# Patient Record
Sex: FEMALE | Race: WHITE
[De-identification: names, ages, dates, MRNs, and addresses within clinical notes are randomized per-mention and may not be internally consistent; named-entity substitution may affect disease eponyms.]

---

## 2018-11-12 ENCOUNTER — HOSPITAL ENCOUNTER (OUTPATIENT)
Dept: HOSPITAL 19 - COL.CAR | Age: 68
Discharge: HOME | End: 2018-11-12
Attending: INTERNAL MEDICINE
Payer: MEDICARE

## 2018-11-12 VITALS — SYSTOLIC BLOOD PRESSURE: 159 MMHG | HEART RATE: 82 BPM | DIASTOLIC BLOOD PRESSURE: 89 MMHG

## 2018-11-12 VITALS — SYSTOLIC BLOOD PRESSURE: 140 MMHG | HEART RATE: 80 BPM | DIASTOLIC BLOOD PRESSURE: 90 MMHG

## 2018-11-12 VITALS — DIASTOLIC BLOOD PRESSURE: 86 MMHG | HEART RATE: 85 BPM | SYSTOLIC BLOOD PRESSURE: 162 MMHG

## 2018-11-12 VITALS — SYSTOLIC BLOOD PRESSURE: 149 MMHG | TEMPERATURE: 97.5 F | HEART RATE: 80 BPM | DIASTOLIC BLOOD PRESSURE: 73 MMHG

## 2018-11-12 VITALS — WEIGHT: 168.87 LBS | HEIGHT: 67.06 IN | BODY MASS INDEX: 26.51 KG/M2

## 2018-11-12 VITALS — SYSTOLIC BLOOD PRESSURE: 158 MMHG | HEART RATE: 82 BPM | DIASTOLIC BLOOD PRESSURE: 80 MMHG

## 2018-11-12 VITALS — HEART RATE: 87 BPM | DIASTOLIC BLOOD PRESSURE: 68 MMHG | SYSTOLIC BLOOD PRESSURE: 110 MMHG

## 2018-11-12 VITALS — HEART RATE: 85 BPM | DIASTOLIC BLOOD PRESSURE: 84 MMHG | SYSTOLIC BLOOD PRESSURE: 145 MMHG

## 2018-11-12 VITALS — DIASTOLIC BLOOD PRESSURE: 89 MMHG | SYSTOLIC BLOOD PRESSURE: 137 MMHG | HEART RATE: 80 BPM

## 2018-11-12 DIAGNOSIS — J44.9: ICD-10-CM

## 2018-11-12 DIAGNOSIS — M81.0: ICD-10-CM

## 2018-11-12 DIAGNOSIS — I44.7: ICD-10-CM

## 2018-11-12 DIAGNOSIS — I42.9: Primary | ICD-10-CM

## 2018-11-12 DIAGNOSIS — Z80.6: ICD-10-CM

## 2018-11-12 DIAGNOSIS — Z80.0: ICD-10-CM

## 2018-11-12 DIAGNOSIS — Z85.048: ICD-10-CM

## 2018-11-12 DIAGNOSIS — I34.0: ICD-10-CM

## 2018-11-12 DIAGNOSIS — F17.210: ICD-10-CM

## 2018-11-12 LAB
ANION GAP SERPL CALC-SCNC: 5 MMOL/L (ref 7–16)
BUN SERPL-MCNC: 14 MG/DL (ref 7–17)
CALCIUM SERPL-MCNC: 9.6 MG/DL (ref 8.4–10.2)
CHLORIDE SERPL-SCNC: 105 MMOL/L (ref 98–107)
CO2 SERPL-SCNC: 29 MMOL/L (ref 22–30)
CREAT SERPL-SCNC: 0.61 MG/DL (ref 0.52–1.25)
ERYTHROCYTE [DISTWIDTH] IN BLOOD BY AUTOMATED COUNT: 14.5 % (ref 11.5–14.5)
GLUCOSE SERPL-MCNC: 97 MG/DL (ref 74–106)
HCT VFR BLD AUTO: 41.1 % (ref 37–47)
HGB BLD-MCNC: 13.4 G/DL (ref 12.5–16)
INR BLD: 1.1 (ref 0.8–3)
MCH RBC QN AUTO: 32 PG (ref 27–31)
MCHC RBC AUTO-ENTMCNC: 33 G/DL (ref 33–37)
MCV RBC AUTO: 99 FL (ref 80–100)
PLATELET # BLD AUTO: 323 K/MM3 (ref 130–400)
PMV BLD AUTO: 8.5 FL (ref 7.4–10.4)
POTASSIUM SERPL-SCNC: 3.9 MMOL/L (ref 3.4–5)
PROTHROMBIN TIME: 12.3 SECONDS (ref 9.7–12.8)
RBC # BLD AUTO: 4.16 M/MM3 (ref 4.1–5.3)
SODIUM SERPL-SCNC: 139 MMOL/L (ref 137–145)

## 2019-02-15 ENCOUNTER — HOSPITAL ENCOUNTER (OUTPATIENT)
Dept: HOSPITAL 19 - COL.CAR | Age: 69
LOS: 1 days | Discharge: HOME | End: 2019-02-16
Attending: INTERNAL MEDICINE
Payer: MEDICARE

## 2019-02-15 VITALS — DIASTOLIC BLOOD PRESSURE: 78 MMHG | HEART RATE: 74 BPM | SYSTOLIC BLOOD PRESSURE: 158 MMHG

## 2019-02-15 VITALS — WEIGHT: 171.96 LBS | BODY MASS INDEX: 26.99 KG/M2 | HEIGHT: 67.05 IN

## 2019-02-15 VITALS — DIASTOLIC BLOOD PRESSURE: 69 MMHG | HEART RATE: 69 BPM | SYSTOLIC BLOOD PRESSURE: 133 MMHG

## 2019-02-15 VITALS — SYSTOLIC BLOOD PRESSURE: 145 MMHG | DIASTOLIC BLOOD PRESSURE: 81 MMHG | HEART RATE: 81 BPM

## 2019-02-15 VITALS — TEMPERATURE: 98.1 F | SYSTOLIC BLOOD PRESSURE: 145 MMHG | HEART RATE: 81 BPM | DIASTOLIC BLOOD PRESSURE: 88 MMHG

## 2019-02-15 VITALS — HEART RATE: 73 BPM | DIASTOLIC BLOOD PRESSURE: 77 MMHG | SYSTOLIC BLOOD PRESSURE: 152 MMHG

## 2019-02-15 VITALS — SYSTOLIC BLOOD PRESSURE: 144 MMHG | TEMPERATURE: 97.5 F | DIASTOLIC BLOOD PRESSURE: 77 MMHG | HEART RATE: 72 BPM

## 2019-02-15 VITALS — HEART RATE: 83 BPM | DIASTOLIC BLOOD PRESSURE: 81 MMHG | SYSTOLIC BLOOD PRESSURE: 141 MMHG

## 2019-02-15 VITALS — DIASTOLIC BLOOD PRESSURE: 88 MMHG | SYSTOLIC BLOOD PRESSURE: 154 MMHG | HEART RATE: 48 BPM

## 2019-02-15 DIAGNOSIS — I42.9: ICD-10-CM

## 2019-02-15 DIAGNOSIS — I34.0: ICD-10-CM

## 2019-02-15 DIAGNOSIS — I47.2: ICD-10-CM

## 2019-02-15 DIAGNOSIS — I44.7: ICD-10-CM

## 2019-02-15 DIAGNOSIS — I50.20: Primary | ICD-10-CM

## 2019-02-15 DIAGNOSIS — J44.9: ICD-10-CM

## 2019-02-15 DIAGNOSIS — M81.0: ICD-10-CM

## 2019-02-15 LAB
ANION GAP SERPL CALC-SCNC: 6 MMOL/L (ref 7–16)
BUN SERPL-MCNC: 15 MG/DL (ref 7–17)
CALCIUM SERPL-MCNC: 9.7 MG/DL (ref 8.4–10.2)
CHLORIDE SERPL-SCNC: 106 MMOL/L (ref 98–107)
CO2 SERPL-SCNC: 28 MMOL/L (ref 22–30)
CREAT SERPL-SCNC: 0.66 MG/DL (ref 0.52–1.25)
ERYTHROCYTE [DISTWIDTH] IN BLOOD BY AUTOMATED COUNT: 16 % (ref 11.5–14.5)
GLUCOSE SERPL-MCNC: 91 MG/DL (ref 74–106)
HCT VFR BLD AUTO: 39.9 % (ref 37–47)
HGB BLD-MCNC: 13.2 G/DL (ref 12.5–16)
INR BLD: 1 (ref 0.8–3)
MCH RBC QN AUTO: 33 PG (ref 27–31)
MCHC RBC AUTO-ENTMCNC: 33 G/DL (ref 33–37)
MCV RBC AUTO: 98 FL (ref 80–100)
PLATELET # BLD AUTO: 252 K/MM3 (ref 130–400)
PMV BLD AUTO: 8.3 FL (ref 7.4–10.4)
POTASSIUM SERPL-SCNC: 4 MMOL/L (ref 3.4–5)
PROTHROMBIN TIME: 11.5 SECONDS (ref 9.7–12.8)
RBC # BLD AUTO: 4.06 M/MM3 (ref 4.1–5.3)
SODIUM SERPL-SCNC: 139 MMOL/L (ref 137–145)

## 2019-02-15 PROCEDURE — C1769 GUIDE WIRE: HCPCS

## 2019-02-15 PROCEDURE — C1900 LEAD, CORONARY VENOUS: HCPCS

## 2019-02-15 PROCEDURE — C1898 LEAD, PMKR, OTHER THAN TRANS: HCPCS

## 2019-02-15 PROCEDURE — C1777 LEAD, AICD, ENDO SINGLE COIL: HCPCS

## 2019-02-15 PROCEDURE — C1894 INTRO/SHEATH, NON-LASER: HCPCS

## 2019-02-15 PROCEDURE — C1882 AICD, OTHER THAN SING/DUAL: HCPCS

## 2019-02-15 NOTE — NUR
VENOGRAM PERFORMED USING LEFT ARM IV SITE. SUBCLAVIAN VEIN PATENT. ALL
SEDATION MEDICATIONS WILL BE GIVEN WITH VERBAL ORDER FROM MD GUTIÉRREZ. SEE MERGE
FOR ADMIN TIMES.

## 2019-02-15 NOTE — NUR
PT IN BED WITH HOB ELEVATED TO 45 DEGREE ANGLE. PT HAS SLING ON AND IS A/O X4.
PT HAS C/O PAIN RATED AT A 6/10, ACHING. PT DOES NOT WANT ANY PAIN MEDICATION
ONLY TYLENOL. REFUSES ICE PACK. CALL LIGHT WITHIN REACH.

## 2019-02-15 NOTE — NUR
pT arrived to unit, alert but sleepy, site to lt chest is CDI. Vitals stable,
physical assessmetn completed, will continue to monitor, call light in reach

## 2019-02-15 NOTE — NUR
Report given to Rebecca CELAYA, pt denies needs. Since lt arrived to unit, dressing
has remained CDI, pt has denied pain until shift change but she refuses
meds/ice at htis time. She has been assisted with standby assist to bathroom
to void clear yellow urine. Pt denies need,s vitals stbale, no needs at this
time. Still sleepy .

## 2019-02-16 VITALS — SYSTOLIC BLOOD PRESSURE: 154 MMHG | HEART RATE: 82 BPM | TEMPERATURE: 98.9 F | DIASTOLIC BLOOD PRESSURE: 72 MMHG

## 2019-02-16 VITALS — TEMPERATURE: 98.4 F | DIASTOLIC BLOOD PRESSURE: 88 MMHG | HEART RATE: 64 BPM | SYSTOLIC BLOOD PRESSURE: 172 MMHG

## 2019-02-16 VITALS — SYSTOLIC BLOOD PRESSURE: 141 MMHG | TEMPERATURE: 98.5 F | HEART RATE: 90 BPM | DIASTOLIC BLOOD PRESSURE: 101 MMHG

## 2019-02-16 VITALS — DIASTOLIC BLOOD PRESSURE: 85 MMHG | SYSTOLIC BLOOD PRESSURE: 157 MMHG

## 2019-02-16 NOTE — NUR
PT IN BED WITH HOB ELEVATED TO 45 DEGREE ANGLE, SOFTLY SNORING, RESP EVEN AND
UNLABORED, WITH NO S/S OF PAIN OR DISTRESS NOTED. CALL LIGHT WITHIN REACH.

## 2019-02-16 NOTE — NUR
UNEVENFUL NIGHT FOR PT. PT SLEPT WITH HOB AT 45 DEGREE ANGLE. PT AWAKENED
AROUND 0500 AND HAD SOME COFFEE. PT HAS PAIN, BUT REFUSES TO TAKE ANY PAIN
MEDICATION OR ICE PACK TO THE LEFT UPPER CHEST. NO FURTHER NEEDS, AND CALL
LIGHT WITHIN REACH.

## 2019-02-16 NOTE — NUR
Assessment complete.patient awake,a/ox4.reports soreness to pacermaker
site.denies need for pain meds.breathing even and unlabored.LSCTA.pacermaker
site to left upper chest CDI.sling in place.education provided.patient took
all meds.no needs voiced at this time.call light in reach

## 2019-02-16 NOTE — NUR
pt discharge darlene at this time.all discharge instructions reviewed.Pacemaker
card given to patient.Iv and telemetry discontinued.all questions
answered.This RN escorted patient out

## 2019-11-11 ENCOUNTER — HOSPITAL ENCOUNTER (INPATIENT)
Dept: HOSPITAL 19 - INPTSU | Age: 69
LOS: 39 days | Discharge: HOME | DRG: 470 | End: 2019-12-20
Attending: ORTHOPAEDIC SURGERY | Admitting: ORTHOPAEDIC SURGERY
Payer: MEDICARE

## 2019-11-11 VITALS — HEIGHT: 67 IN | WEIGHT: 160.5 LBS | BODY MASS INDEX: 25.19 KG/M2

## 2019-11-11 DIAGNOSIS — M16.11: Primary | ICD-10-CM

## 2019-11-11 PROCEDURE — A4314 CATH W/DRAINAGE 2-WAY LATEX: HCPCS

## 2019-11-11 PROCEDURE — A9284 NON-ELECTRONIC SPIROMETER: HCPCS

## 2019-11-11 PROCEDURE — C1713 ANCHOR/SCREW BN/BN,TIS/BN: HCPCS

## 2019-11-11 PROCEDURE — C1776 JOINT DEVICE (IMPLANTABLE): HCPCS

## 2019-12-09 ENCOUNTER — HOSPITAL ENCOUNTER (OUTPATIENT)
Dept: HOSPITAL 19 - COL.LAB | Age: 69
End: 2019-12-09
Attending: ORTHOPAEDIC SURGERY
Payer: MEDICARE

## 2019-12-09 DIAGNOSIS — Z01.812: Primary | ICD-10-CM

## 2019-12-09 DIAGNOSIS — Z01.83: ICD-10-CM

## 2019-12-09 DIAGNOSIS — M16.11: ICD-10-CM

## 2019-12-18 VITALS — TEMPERATURE: 97.3 F | SYSTOLIC BLOOD PRESSURE: 126 MMHG | HEART RATE: 73 BPM | DIASTOLIC BLOOD PRESSURE: 67 MMHG

## 2019-12-18 VITALS — SYSTOLIC BLOOD PRESSURE: 148 MMHG | HEART RATE: 79 BPM | DIASTOLIC BLOOD PRESSURE: 78 MMHG | TEMPERATURE: 97.6 F

## 2019-12-18 VITALS — SYSTOLIC BLOOD PRESSURE: 140 MMHG | DIASTOLIC BLOOD PRESSURE: 76 MMHG | HEART RATE: 61 BPM

## 2019-12-18 VITALS — SYSTOLIC BLOOD PRESSURE: 137 MMHG | TEMPERATURE: 97.6 F | DIASTOLIC BLOOD PRESSURE: 66 MMHG | HEART RATE: 63 BPM

## 2019-12-18 VITALS — DIASTOLIC BLOOD PRESSURE: 76 MMHG | SYSTOLIC BLOOD PRESSURE: 139 MMHG | HEART RATE: 66 BPM

## 2019-12-18 VITALS — DIASTOLIC BLOOD PRESSURE: 70 MMHG | HEART RATE: 60 BPM | SYSTOLIC BLOOD PRESSURE: 136 MMHG

## 2019-12-18 VITALS — HEART RATE: 59 BPM | SYSTOLIC BLOOD PRESSURE: 148 MMHG | DIASTOLIC BLOOD PRESSURE: 66 MMHG

## 2019-12-18 VITALS — HEART RATE: 66 BPM | SYSTOLIC BLOOD PRESSURE: 134 MMHG | DIASTOLIC BLOOD PRESSURE: 64 MMHG

## 2019-12-18 VITALS — DIASTOLIC BLOOD PRESSURE: 88 MMHG | HEART RATE: 65 BPM | SYSTOLIC BLOOD PRESSURE: 142 MMHG

## 2019-12-18 VITALS — SYSTOLIC BLOOD PRESSURE: 126 MMHG | DIASTOLIC BLOOD PRESSURE: 73 MMHG | HEART RATE: 67 BPM

## 2019-12-18 VITALS — DIASTOLIC BLOOD PRESSURE: 65 MMHG | TEMPERATURE: 97.6 F | SYSTOLIC BLOOD PRESSURE: 132 MMHG | HEART RATE: 74 BPM

## 2019-12-18 PROCEDURE — 0SR90JZ REPLACEMENT OF RIGHT HIP JOINT WITH SYNTHETIC SUBSTITUTE, OPEN APPROACH: ICD-10-PCS | Performed by: ORTHOPAEDIC SURGERY

## 2019-12-18 NOTE — NUR
Lying in bed with eyes closed. Eyes open when enter room. Rates pain in right
hip 6/10, describes a sachy. Administered scheduled Tylenol at this time.
Patient denies further needs.

## 2019-12-18 NOTE — NUR
Sitting up in bed. Dressing to right hip CDI. Ice is to right hip. SCDs are
on. Gutierrez patent draining clear yellow urine. Rates pain 8/10, describes the
pain as constant burning and throbbing sensation. Will administer pain
medication as prescribed. Patient is ready to get up at this time to try to
ambulate.

## 2019-12-18 NOTE — NUR
YOSEF met with the patient and her , Darryn to discuss a discharge plan. The
[place] in [place] with Darryn and their two dogs. The patient has a
walker and brought it to the hospital with her, a cane, walk-in shower, toilet
riser and a bedside commode. The patient reports independence with ADLs. The
patient's PCP is Dr. Roberto Alcaraz and receives medications from [facility] with no
difficulties.  The patient does not have advanced directives in the EMR and
reports her  will make any decision needed. The patient has outpatient
physical therapy [facility] in [place].
The patient will be transported home by Darryn.  There are no additional needs at
this time.

## 2019-12-18 NOTE — NUR
Patient ambulates outside door to room and back to bed. Gait steady and slow.
Used walker without difficulty. Administered pain medication as prescribed.
Patient denies further needs.

## 2019-12-19 VITALS — DIASTOLIC BLOOD PRESSURE: 71 MMHG | TEMPERATURE: 97.8 F | SYSTOLIC BLOOD PRESSURE: 145 MMHG | HEART RATE: 68 BPM

## 2019-12-19 VITALS — DIASTOLIC BLOOD PRESSURE: 62 MMHG | HEART RATE: 79 BPM | SYSTOLIC BLOOD PRESSURE: 125 MMHG | TEMPERATURE: 97.4 F

## 2019-12-19 VITALS — SYSTOLIC BLOOD PRESSURE: 123 MMHG | HEART RATE: 79 BPM | DIASTOLIC BLOOD PRESSURE: 62 MMHG | TEMPERATURE: 97.9 F

## 2019-12-19 VITALS — HEART RATE: 76 BPM | TEMPERATURE: 97.9 F | DIASTOLIC BLOOD PRESSURE: 58 MMHG | SYSTOLIC BLOOD PRESSURE: 113 MMHG

## 2019-12-19 VITALS — DIASTOLIC BLOOD PRESSURE: 59 MMHG | TEMPERATURE: 98 F | SYSTOLIC BLOOD PRESSURE: 123 MMHG | HEART RATE: 80 BPM

## 2019-12-19 LAB
HCT VFR BLD AUTO: 33.7 % (ref 37–47)
HGB BLD-MCNC: 11.3 G/DL (ref 12.5–16)

## 2019-12-19 NOTE — NUR
Pt. sitting up in bed at this time. Pt. is A&OX3, assessment complete.  INT to
lt. forearm patent.  Pt. reports pain at a 5, pain meds given.  Dressing to
rt. hip CDI.  Pt. denies further needs, call light within reach.

## 2019-12-19 NOTE — NUR
Patient tolerating PO fluids without difficulty. IV fluids discontinued at
this time. Rates pain 6/10 and describes as throbbing. Administered regular
scheduled meds and explained to the patient that if this does not help
alleviate the pain in and hour to let us know and we can administer another
medication. Patient verbalizes understanding and denies further needs at this
time. Dressing to right hip CDI. Ice is still at right hip. Gutierrez patent
draining clear yellow urine.

## 2019-12-20 VITALS — DIASTOLIC BLOOD PRESSURE: 54 MMHG | TEMPERATURE: 97.9 F | SYSTOLIC BLOOD PRESSURE: 114 MMHG | HEART RATE: 77 BPM

## 2019-12-20 VITALS — HEART RATE: 111 BPM | TEMPERATURE: 97.6 F | DIASTOLIC BLOOD PRESSURE: 89 MMHG | SYSTOLIC BLOOD PRESSURE: 107 MMHG

## 2019-12-20 VITALS — SYSTOLIC BLOOD PRESSURE: 97 MMHG | DIASTOLIC BLOOD PRESSURE: 58 MMHG | TEMPERATURE: 98.8 F | HEART RATE: 92 BPM

## 2019-12-20 LAB
HCT VFR BLD AUTO: 35.7 % (ref 37–47)
HGB BLD-MCNC: 11.7 G/DL (ref 12.5–16)

## 2019-12-20 NOTE — NUR
PT UP TO RECLINER AFTER THERAPY. DRESSING CHANGE COMPLETE, EDGES WELL
APPROXIMATED WITH STERI STRIPS OVER INCISION. PT TOLERATED WELL. DISCHARGE
INSTRUCTIONS RECIEVED.

## 2019-12-20 NOTE — NUR
DISCHARGE INSTRUCTIONS PROVIDED TO PT AND SPOUSE, QUESTIONS SOLICITED AND
ANSWERED. PT TAKEN TO FRONT BY WHEEL CHAIR. PT LEFT IN GOOD CONDITION.

## 2020-06-18 ENCOUNTER — HOSPITAL ENCOUNTER (OUTPATIENT)
Dept: HOSPITAL 19 - COL.LAB | Age: 70
End: 2020-06-18
Attending: ORTHOPAEDIC SURGERY
Payer: MEDICARE

## 2020-06-18 DIAGNOSIS — Z20.828: Primary | ICD-10-CM

## 2020-06-23 ENCOUNTER — HOSPITAL ENCOUNTER (OUTPATIENT)
Dept: HOSPITAL 19 - SDCO | Age: 70
LOS: 2 days | Discharge: HOME | End: 2020-06-25
Attending: ORTHOPAEDIC SURGERY
Payer: MEDICARE

## 2020-06-23 VITALS — DIASTOLIC BLOOD PRESSURE: 83 MMHG | SYSTOLIC BLOOD PRESSURE: 164 MMHG | HEART RATE: 61 BPM

## 2020-06-23 VITALS — SYSTOLIC BLOOD PRESSURE: 154 MMHG | HEART RATE: 65 BPM | DIASTOLIC BLOOD PRESSURE: 86 MMHG

## 2020-06-23 VITALS — WEIGHT: 150.8 LBS | BODY MASS INDEX: 23.67 KG/M2 | HEIGHT: 67 IN

## 2020-06-23 VITALS — HEART RATE: 61 BPM | DIASTOLIC BLOOD PRESSURE: 82 MMHG | SYSTOLIC BLOOD PRESSURE: 158 MMHG

## 2020-06-23 VITALS — SYSTOLIC BLOOD PRESSURE: 165 MMHG | HEART RATE: 83 BPM | TEMPERATURE: 98.6 F | DIASTOLIC BLOOD PRESSURE: 72 MMHG

## 2020-06-23 VITALS — TEMPERATURE: 94.3 F | HEART RATE: 74 BPM | SYSTOLIC BLOOD PRESSURE: 105 MMHG | DIASTOLIC BLOOD PRESSURE: 81 MMHG

## 2020-06-23 VITALS — SYSTOLIC BLOOD PRESSURE: 139 MMHG | DIASTOLIC BLOOD PRESSURE: 85 MMHG | HEART RATE: 78 BPM

## 2020-06-23 VITALS — DIASTOLIC BLOOD PRESSURE: 86 MMHG | HEART RATE: 72 BPM | TEMPERATURE: 97.6 F | SYSTOLIC BLOOD PRESSURE: 150 MMHG

## 2020-06-23 VITALS — SYSTOLIC BLOOD PRESSURE: 143 MMHG | DIASTOLIC BLOOD PRESSURE: 70 MMHG | HEART RATE: 68 BPM

## 2020-06-23 VITALS — HEART RATE: 79 BPM | SYSTOLIC BLOOD PRESSURE: 144 MMHG | DIASTOLIC BLOOD PRESSURE: 79 MMHG | TEMPERATURE: 95.3 F

## 2020-06-23 VITALS — SYSTOLIC BLOOD PRESSURE: 147 MMHG | TEMPERATURE: 97.9 F | HEART RATE: 83 BPM | DIASTOLIC BLOOD PRESSURE: 85 MMHG

## 2020-06-23 VITALS — SYSTOLIC BLOOD PRESSURE: 150 MMHG | DIASTOLIC BLOOD PRESSURE: 81 MMHG | HEART RATE: 67 BPM

## 2020-06-23 DIAGNOSIS — I11.0: ICD-10-CM

## 2020-06-23 DIAGNOSIS — J30.1: ICD-10-CM

## 2020-06-23 DIAGNOSIS — G89.29: ICD-10-CM

## 2020-06-23 DIAGNOSIS — Z98.51: ICD-10-CM

## 2020-06-23 DIAGNOSIS — Z85.048: ICD-10-CM

## 2020-06-23 DIAGNOSIS — Z95.0: ICD-10-CM

## 2020-06-23 DIAGNOSIS — K58.9: ICD-10-CM

## 2020-06-23 DIAGNOSIS — M54.5: ICD-10-CM

## 2020-06-23 DIAGNOSIS — Z79.899: ICD-10-CM

## 2020-06-23 DIAGNOSIS — F17.210: ICD-10-CM

## 2020-06-23 DIAGNOSIS — I50.9: ICD-10-CM

## 2020-06-23 DIAGNOSIS — Z96.641: ICD-10-CM

## 2020-06-23 DIAGNOSIS — S72.111A: Primary | ICD-10-CM

## 2020-06-23 DIAGNOSIS — J44.9: ICD-10-CM

## 2020-06-23 PROCEDURE — C1713 ANCHOR/SCREW BN/BN,TIS/BN: HCPCS

## 2020-06-23 NOTE — NUR
PATIENT RESTING IN BED WITH DINNER TRAY AT THE BEDSIDE. PATIENT RATING HER
PAIN AS A 9/10 ON A 0-10 SCALE. PATIENT GIVEN PRN PO ROXICODONE AT THIS TIME.
WILL CONTINUE TO MONITOR.

## 2020-06-23 NOTE — NUR
PATIENT IS STILL RATING HER PAIN IN THE RIGHT HIP AS A 9/10 ON A 0-10 SCALE
AFTER TAKING THE ROXICODONE. ADDITIONAL PAIN MEDICATION OFFERED AND REFUSED.
ICE PACKS IN PLACE TO RIGHT HIP AND LEFT SHOULDER. CALL LIGHT WITHIN REACH.
PATIENT DENIES ANY OTHER NEEDS AT THIS TIME.

## 2020-06-23 NOTE — NUR
POST-OP VSS STABLE AND COMPLETE. PATIENT GIVEN A WARM BLANKET AT THIS TIME.
WILL CONTINUE TO MONITOR.

## 2020-06-23 NOTE — NUR
Pt. sitting up in bed at this time. Pt. is A&OX3, assessment complete. IV to
rt. forearm patent, IV fluids infusing per orders.  Dressint to. rt. hip CDI.
Pt. denies pain or other needs. Call light within reach.

## 2020-06-23 NOTE — NUR
PATIENT ARRIVED TO ROOM 344 VIA BED FROM PACU. PATIENT IS A&O BUT SEDATED FROM
SURGERY. POST-OP VSS. TEMPERATURE IS LOW. PATIENT REPORTS THAT THIS IS NORMAL
FOR HER. BODY IS WARM TO THE TOUCH. PATIENT REQUESTED AND GIVEN A WARM BLANKET
AT THIS TIME. RIGHT HIP OCCLUSIVE FOAM TAPE DRESSING IS CD&I. ABDUCTOR PILLOW
IN PLACE. POSITIVE PEDAL PULSES EQUAL BILATERALLY. CAP REFILL <3 SECONDS.
PATIENT UNABLE TO FEEL TOES AT THIS TIME. SCD'S TO BLE. IV FLUIDS INFUSING TO
RIGHT FOREARM IV. CALL LIGHT WITHIN REACH. FAMILY PRESENT AT THE BEDSIDE. ICE
PACK TO RIGHT HIP AND LEFT SHOULDER. NO NEEDS AT THIS TIME.

## 2020-06-23 NOTE — NUR
POST-OP VSS. PATIENT TOLERATING FOOD AND LIQUIDS WITHOUT ANY COMPLAINTS OF
N/V. PATIENT GIVEN PRN PO DOSE OF NORCO AT THIS TIME FOR PAIN. CALL LIGHT
WITHIN REACH.

## 2020-06-23 NOTE — NUR
YOSEF met with the patient and the patient's , Darryn to complete initial
intake. The patient and Darryn [place] in [place]. The patient has a cane,
walker, and is independent with ADLs. The patient's PCP is Dr. Roberto Alcaraz
and receives medications from [facility]. The patient does not have advanced
directives and was not interested in a DPOA-HC at this time. The patient plans
to return home with Darryn. [facility]
3x a week and will begin once the surgeon gives her permission. There are no
additional needs at this time.

## 2020-06-23 NOTE — NUR
Patient to the OR with YOMI Briones at this time. Patient's belongings are labeled
with stickers and taken to the PACU.

## 2020-06-24 VITALS — DIASTOLIC BLOOD PRESSURE: 83 MMHG | HEART RATE: 99 BPM | TEMPERATURE: 98.9 F | SYSTOLIC BLOOD PRESSURE: 155 MMHG

## 2020-06-24 VITALS — SYSTOLIC BLOOD PRESSURE: 112 MMHG | DIASTOLIC BLOOD PRESSURE: 70 MMHG | TEMPERATURE: 98.2 F | HEART RATE: 93 BPM

## 2020-06-24 VITALS — HEART RATE: 112 BPM | SYSTOLIC BLOOD PRESSURE: 135 MMHG | DIASTOLIC BLOOD PRESSURE: 75 MMHG | TEMPERATURE: 98.5 F

## 2020-06-24 VITALS — TEMPERATURE: 98.1 F | SYSTOLIC BLOOD PRESSURE: 140 MMHG | HEART RATE: 103 BPM | DIASTOLIC BLOOD PRESSURE: 79 MMHG

## 2020-06-24 VITALS — DIASTOLIC BLOOD PRESSURE: 87 MMHG | HEART RATE: 100 BPM | TEMPERATURE: 98.2 F | SYSTOLIC BLOOD PRESSURE: 142 MMHG

## 2020-06-24 VITALS — SYSTOLIC BLOOD PRESSURE: 114 MMHG | HEART RATE: 97 BPM | DIASTOLIC BLOOD PRESSURE: 68 MMHG | TEMPERATURE: 98.1 F

## 2020-06-24 VITALS — TEMPERATURE: 98.6 F | HEART RATE: 100 BPM | SYSTOLIC BLOOD PRESSURE: 139 MMHG | DIASTOLIC BLOOD PRESSURE: 66 MMHG

## 2020-06-24 NOTE — NUR
Anju from [facility] states the hospital will need to provide a purchase order
[facility]. SW staffed with Jenelle mireles and she is to check with materials managment if the brace was
in stock. Will continue monitor.

## 2020-06-24 NOTE — NUR
The patient is needing a right hip abduction orthosis for amulation and would
need it delivered here to the hospital.  YOSEF contacted Anju at [facility]
([phone]) and she states they need a script. YOSEF obtained the script
from the patient's nurse and faxed it to ([phone]). Anju states they
may need more information since they have to deliver it to the hospital. She
will contact her office in [place] if she needs any additional information. YOSEF
collaborated the above information with the patient's nurse.

## 2020-06-25 VITALS — SYSTOLIC BLOOD PRESSURE: 130 MMHG | DIASTOLIC BLOOD PRESSURE: 79 MMHG | HEART RATE: 96 BPM | TEMPERATURE: 98.2 F

## 2020-06-25 VITALS — HEART RATE: 90 BPM | DIASTOLIC BLOOD PRESSURE: 90 MMHG | SYSTOLIC BLOOD PRESSURE: 144 MMHG | TEMPERATURE: 98.4 F

## 2020-06-25 VITALS — SYSTOLIC BLOOD PRESSURE: 133 MMHG | TEMPERATURE: 98.5 F | HEART RATE: 99 BPM | DIASTOLIC BLOOD PRESSURE: 88 MMHG

## 2020-06-25 NOTE — NUR
PATIENT RIGHT FOREARM INT DISCONTINUED PER PENDING DISCHARGE. TIP INTACT.
PATIENT TOLERATED WELL. DISCHARGE INSTRUCTIONS REVIEWED WITH PATIENT AND
. QUESTIONS SOUGHT AND ANSWERED. PATIENT PERSONAL BELONGINGS GATHERED.
PATIENT TAKEN TO PERSONAL VEHICLE VIA WHEELCHAIR BY SURGICAL STAFF. PATIENT
DISCHARGED.

## 2020-06-25 NOTE — NUR
The purchase order was provided. YOSEF faxed the order to Pradeep at [facility].
He will deliver the brace this day in-between [id number]. YOSEF informed ER
entrance staff, house supervisor, and the patient's nurse.

## 2020-06-25 NOTE — NUR
Patient has rested well throughout the night. Abductor pillow in between legs.
Purewick in place, draining clear, yellow urine. Called out at about 0030 for
pain medication. This was administered about 0100 and effective as evidenced
by patient sleeping. Patient stated she hasn't had a bowel movement in 3 days
and requests something for this. Patient notified that there wasn't an order
for anything, but that I would call and get something if needed and patient
stated she would be okay with talking with the physician in the morning for
something to help with her constipation. Aquacel to right hip CDI. Denies any
further needs. Will continue to monitor.

## 2020-06-25 NOTE — NUR
CALLED AND NOTIFIED THAT THE PATIENTS BRACE ARRIVED AND WAS FITTED.
PATIENT WORKED WITH PHYSICAL THERAPY AND THAT WENT WELL. PATIENT IS READY TO
DISCHARGE.

## 2020-06-25 NOTE — NUR
The patient is to tentatively discharge home today, 6/25. She will be doing
outpatient PT at [facility]. There are no additional needs.

## 2020-06-25 NOTE — NUR
PATIENT IS RATING HER PAIN AS A 7/10 ON A 0-10 SCALE IN THE RIGHT HIP AND
BACK. PATIENT GIVEN PRN PAIN MEDICATION AT THIS TIME. WILL CONTINUE TO
MONITOR.

## 2022-02-07 ENCOUNTER — HOSPITAL ENCOUNTER (OUTPATIENT)
Dept: HOSPITAL 19 - COL.RAD | Age: 72
End: 2022-02-07
Attending: ORTHOPAEDIC SURGERY
Payer: MEDICARE

## 2022-02-07 DIAGNOSIS — M47.815: Primary | ICD-10-CM

## 2022-02-07 DIAGNOSIS — M48.061: ICD-10-CM

## 2022-02-07 DIAGNOSIS — M48.05: ICD-10-CM

## 2022-02-07 DIAGNOSIS — M48.07: ICD-10-CM

## 2022-02-07 DIAGNOSIS — M43.16: ICD-10-CM
